# Patient Record
Sex: MALE | Race: WHITE | NOT HISPANIC OR LATINO | Employment: FULL TIME | URBAN - METROPOLITAN AREA
[De-identification: names, ages, dates, MRNs, and addresses within clinical notes are randomized per-mention and may not be internally consistent; named-entity substitution may affect disease eponyms.]

---

## 2019-04-25 ENCOUNTER — APPOINTMENT (OUTPATIENT)
Dept: RADIOLOGY | Facility: MEDICAL CENTER | Age: 29
End: 2019-04-25
Attending: EMERGENCY MEDICINE
Payer: COMMERCIAL

## 2019-04-25 ENCOUNTER — HOSPITAL ENCOUNTER (EMERGENCY)
Facility: MEDICAL CENTER | Age: 29
End: 2019-04-25
Attending: EMERGENCY MEDICINE
Payer: COMMERCIAL

## 2019-04-25 VITALS
WEIGHT: 203.04 LBS | TEMPERATURE: 98.8 F | HEIGHT: 75 IN | HEART RATE: 76 BPM | DIASTOLIC BLOOD PRESSURE: 31 MMHG | BODY MASS INDEX: 25.25 KG/M2 | OXYGEN SATURATION: 92 % | SYSTOLIC BLOOD PRESSURE: 112 MMHG | RESPIRATION RATE: 16 BRPM

## 2019-04-25 DIAGNOSIS — J10.1 INFLUENZA A: ICD-10-CM

## 2019-04-25 LAB
ALBUMIN SERPL BCP-MCNC: 4.7 G/DL (ref 3.2–4.9)
ALBUMIN/GLOB SERPL: 2 G/DL
ALP SERPL-CCNC: 42 U/L (ref 30–99)
ALT SERPL-CCNC: 16 U/L (ref 2–50)
ANION GAP SERPL CALC-SCNC: 9 MMOL/L (ref 0–11.9)
APPEARANCE UR: CLEAR
AST SERPL-CCNC: 19 U/L (ref 12–45)
BACTERIA #/AREA URNS HPF: NEGATIVE /HPF
BASOPHILS # BLD AUTO: 0.9 % (ref 0–1.8)
BASOPHILS # BLD: 0.04 K/UL (ref 0–0.12)
BILIRUB SERPL-MCNC: 0.9 MG/DL (ref 0.1–1.5)
BILIRUB UR QL STRIP.AUTO: NEGATIVE
BUN SERPL-MCNC: 14 MG/DL (ref 8–22)
CALCIUM SERPL-MCNC: 8.9 MG/DL (ref 8.4–10.2)
CHLORIDE SERPL-SCNC: 101 MMOL/L (ref 96–112)
CO2 SERPL-SCNC: 24 MMOL/L (ref 20–33)
COLOR UR: YELLOW
CREAT SERPL-MCNC: 1.51 MG/DL (ref 0.5–1.4)
EOSINOPHIL # BLD AUTO: 0.03 K/UL (ref 0–0.51)
EOSINOPHIL NFR BLD: 0.7 % (ref 0–6.9)
EPI CELLS #/AREA URNS HPF: NEGATIVE /HPF
ERYTHROCYTE [DISTWIDTH] IN BLOOD BY AUTOMATED COUNT: 38.6 FL (ref 35.9–50)
FLUAV RNA SPEC QL NAA+PROBE: POSITIVE
FLUBV RNA SPEC QL NAA+PROBE: NEGATIVE
GLOBULIN SER CALC-MCNC: 2.4 G/DL (ref 1.9–3.5)
GLUCOSE SERPL-MCNC: 95 MG/DL (ref 65–99)
GLUCOSE UR STRIP.AUTO-MCNC: NEGATIVE MG/DL
HCT VFR BLD AUTO: 43.7 % (ref 42–52)
HGB BLD-MCNC: 15 G/DL (ref 14–18)
HYALINE CASTS #/AREA URNS LPF: ABNORMAL /LPF
IMM GRANULOCYTES # BLD AUTO: 0.01 K/UL (ref 0–0.11)
IMM GRANULOCYTES NFR BLD AUTO: 0.2 % (ref 0–0.9)
KETONES UR STRIP.AUTO-MCNC: ABNORMAL MG/DL
LACTATE BLD-SCNC: 1.1 MMOL/L (ref 0.5–2)
LEUKOCYTE ESTERASE UR QL STRIP.AUTO: NEGATIVE
LYMPHOCYTES # BLD AUTO: 0.8 K/UL (ref 1–4.8)
LYMPHOCYTES NFR BLD: 18.6 % (ref 22–41)
MCH RBC QN AUTO: 30.1 PG (ref 27–33)
MCHC RBC AUTO-ENTMCNC: 34.3 G/DL (ref 33.7–35.3)
MCV RBC AUTO: 87.6 FL (ref 81.4–97.8)
MICRO URNS: ABNORMAL
MONOCYTES # BLD AUTO: 0.63 K/UL (ref 0–0.85)
MONOCYTES NFR BLD AUTO: 14.6 % (ref 0–13.4)
MUCOUS THREADS #/AREA URNS HPF: ABNORMAL /HPF
NEUTROPHILS # BLD AUTO: 2.8 K/UL (ref 1.82–7.42)
NEUTROPHILS NFR BLD: 65 % (ref 44–72)
NITRITE UR QL STRIP.AUTO: NEGATIVE
NRBC # BLD AUTO: 0 K/UL
NRBC BLD-RTO: 0 /100 WBC
PH UR STRIP.AUTO: 6 [PH]
PLATELET # BLD AUTO: 140 K/UL (ref 164–446)
PMV BLD AUTO: 10.4 FL (ref 9–12.9)
POTASSIUM SERPL-SCNC: 3.5 MMOL/L (ref 3.6–5.5)
PROT SERPL-MCNC: 7.1 G/DL (ref 6–8.2)
PROT UR QL STRIP: 30 MG/DL
RBC # BLD AUTO: 4.99 M/UL (ref 4.7–6.1)
RBC # URNS HPF: ABNORMAL /HPF
RBC UR QL AUTO: NEGATIVE
SODIUM SERPL-SCNC: 134 MMOL/L (ref 135–145)
SP GR UR STRIP.AUTO: 1.01
WBC # BLD AUTO: 4.3 K/UL (ref 4.8–10.8)
WBC #/AREA URNS HPF: ABNORMAL /HPF

## 2019-04-25 PROCEDURE — 87086 URINE CULTURE/COLONY COUNT: CPT

## 2019-04-25 PROCEDURE — 81001 URINALYSIS AUTO W/SCOPE: CPT

## 2019-04-25 PROCEDURE — 87040 BLOOD CULTURE FOR BACTERIA: CPT | Mod: 91

## 2019-04-25 PROCEDURE — 71046 X-RAY EXAM CHEST 2 VIEWS: CPT

## 2019-04-25 PROCEDURE — 36415 COLL VENOUS BLD VENIPUNCTURE: CPT

## 2019-04-25 PROCEDURE — 85025 COMPLETE CBC W/AUTO DIFF WBC: CPT

## 2019-04-25 PROCEDURE — 83605 ASSAY OF LACTIC ACID: CPT

## 2019-04-25 PROCEDURE — 99284 EMERGENCY DEPT VISIT MOD MDM: CPT

## 2019-04-25 PROCEDURE — 87502 INFLUENZA DNA AMP PROBE: CPT

## 2019-04-25 PROCEDURE — A9270 NON-COVERED ITEM OR SERVICE: HCPCS | Performed by: EMERGENCY MEDICINE

## 2019-04-25 PROCEDURE — 80053 COMPREHEN METABOLIC PANEL: CPT

## 2019-04-25 PROCEDURE — 700102 HCHG RX REV CODE 250 W/ 637 OVERRIDE(OP): Performed by: EMERGENCY MEDICINE

## 2019-04-25 RX ORDER — IBUPROFEN 600 MG/1
600 TABLET ORAL ONCE
Status: COMPLETED | OUTPATIENT
Start: 2019-04-25 | End: 2019-04-25

## 2019-04-25 RX ORDER — BENZONATATE 100 MG/1
100 CAPSULE ORAL 3 TIMES DAILY PRN
Qty: 15 CAP | Refills: 0 | Status: SHIPPED | OUTPATIENT
Start: 2019-04-25 | End: 2019-04-30

## 2019-04-25 RX ORDER — OSELTAMIVIR PHOSPHATE 75 MG/1
75 CAPSULE ORAL 2 TIMES DAILY
Qty: 10 CAP | Refills: 0 | Status: SHIPPED | OUTPATIENT
Start: 2019-04-25 | End: 2019-04-30

## 2019-04-25 RX ORDER — ACETAMINOPHEN 325 MG/1
650 TABLET ORAL ONCE
Status: COMPLETED | OUTPATIENT
Start: 2019-04-25 | End: 2019-04-25

## 2019-04-25 RX ADMIN — ACETAMINOPHEN 650 MG: 325 TABLET, FILM COATED ORAL at 14:22

## 2019-04-25 RX ADMIN — IBUPROFEN 600 MG: 600 TABLET ORAL at 14:23

## 2019-04-25 NOTE — ED NOTES
jeramie from Lab called with critical result of influenza at A positive. Critical lab result read back to jeramie.   Dr. white notified of critical lab result at 1552.  Critical lab result read back by Dr. white.

## 2019-04-25 NOTE — DISCHARGE INSTRUCTIONS
Return to the ER for any worsening cough, coughing of rust colored phlegm or blood, chest pain, shortness of breath, persistent fevers over 101, headache, stiff neck, rash, vomiting, or for any concerns.    Avoid contact with the public as you are extremely contagious.    Drink plenty of fluids and get lots of rest!  Take over-the-counter Tylenol and ibuprofen for fever control and muscle aches/body aches.

## 2019-04-25 NOTE — ED NOTES
PIV started, blood and 1 st culture sent along with a UA. Plan discussed with pt. Medicated for fever.

## 2019-04-25 NOTE — ED NOTES
Sepsis score recalculated to a 2, sepsis orders held in leiu of ERP evaluation. this pt states he has seasonal allergies and usually gets a Kenalog shot. He does have a mask on. Refused his chest xray

## 2019-04-25 NOTE — ED NOTES
Released with all written instructions for flu, released to POV, encouraged to drink fluids and rest, advised to return with any worsening and to take Tylenol and Motrin for fever control.

## 2019-04-25 NOTE — ED TRIAGE NOTES
"Chief Complaint   Patient presents with   • Fever     started last NOC   • Cough     started yesterday, reports gets \"severe\" allegeries every year, has not been able to get Kenalog shot this year     /73   Pulse 96   Temp (!) 39 °C (102.2 °F) (Temporal)   Resp 17   Ht 1.905 m (6' 3\")   Wt 92.1 kg (203 lb 0.7 oz)   SpO2 96%   BMI 25.38 kg/m²     "

## 2019-04-25 NOTE — ED PROVIDER NOTES
"ED Provider Note    CHIEF COMPLAINT  Chief Complaint   Patient presents with   • Fever     started last NOC   • Cough     started yesterday, reports gets \"severe\" allegeries every year, has not been able to get Kenalog shot this year       HPI  Phillip Cardoso is a 28 y.o. male who presents with complaint of dry cough, little bit of lightheadedness and some fevers and chills since yesterday afternoon.  Patient states that 2 weeks ago he started developing what he thought were \"allergies.\"  He states he gets allergies every year about the same time.  When he started getting allergy symptoms he made an appoint with his primary care physician.  He has an appointment with his PMD this coming Monday open (5 days from now.)  This is to get his Kenalog shot.  He states over the last 2 weeks he has had nasal congestion, runny nose, itchy watery eyes.  He states that yesterday he was working out in the yard.  He was moving sod around the yard.  He states in the afternoon he started feeling lightheaded almost as if he had not eaten enough and did not have \"enough energy\" for the work he was doing.  Last night he had chills.  He suspected he might of had a subjective fever.  He had a place an extra sweatshirt on to keep from getting too cold.  He also developed a dry cough.  He has some nasal congestion and runny nose with thought it was related to his allergies.  He has a temperature here today in the emergency room.  He describes a little bit of myalgias, mostly in his low back but thought it was because of sitting on the couch for too long last night.  He is a bit of a headache but states is not the worst of his life.  No rash.  No stiff neck.  No nausea or vomiting.  No diarrhea.  No abdominal pain.  No shortness of breath or chest pains.  No coughing of phlegm or blood.    REVIEW OF SYSTEMS  See HPI for further details. All other systems are negative.    PAST MEDICAL HISTORY  History reviewed. No pertinent past " "medical history.    FAMILY HISTORY  History reviewed. No pertinent family history.    SOCIAL HISTORY  Social History     Social History   • Marital status: Single     Spouse name: N/A   • Number of children: N/A   • Years of education: N/A     Social History Main Topics   • Smoking status: Never Smoker   • Smokeless tobacco: Not on file   • Alcohol use Yes   • Drug use: No   • Sexual activity: Not on file     Other Topics Concern   • Not on file     Social History Narrative   • No narrative on file       SURGICAL HISTORY  History reviewed. No pertinent surgical history.    CURRENT MEDICATIONS  Home Medications     Reviewed by Norbert Dietz (Pharmacy Tech) on 04/25/19 at 1353  Med List Status: Complete   Medication Last Dose Status        Patient Karel Taking any Medications                       ALLERGIES  No Known Allergies    PHYSICAL EXAM  VITAL SIGNS: /73   Pulse 96   Temp (!) 39 °C (102.2 °F) (Temporal)   Resp 17   Ht 1.905 m (6' 3\")   Wt 92.1 kg (203 lb 0.7 oz)   SpO2 96%   BMI 25.38 kg/m²    Constitutional: Well developed, well nourished; No acute distress; Non-toxic appearance.   HENT: Normocephalic, atraumatic; Bilateral external ears normal; TMs are clear.  Oropharynx with moist mucous membranes; mild erythema without exudates in the posterior oropharynx.   Eyes: PERRL, EOMI, Conjunctiva normal. No discharge.   Neck:  Supple, nontender midline; No stridor; No nuchal rigidity.   Lymphatic: No cervical lymphadenopathy noted.   Cardiovascular: Regular rate and rhythm without murmurs, rubs, or gallop.   Thorax & Lungs: No respiratory distress, breath sounds clear to auscultation bilaterally without wheezing, rales or rhonchi. Nontender chest wall. No crepitus or subcutaneous air  Abdomen: Soft, nontender, bowel sounds normal. No obvious masses; No pulsatile masses; no rebound, guarding, or peritoneal signs.   Skin: Good color; warm and dry without rash or petechia.  Back: Nontender, No CVA " tenderness.   Extremities: Distal dorsalis pedis, posterior tibial pulses are equal bilaterally; No edema; Nontender calves or saphenous.   Musculoskeletal: Good range of motion in all major joints. No tenderness to palpation or major deformities noted.   Neurologic: Alert & oriented x 4, clear speech      RADIOLOGY/PROCEDURES  DX-CHEST-2 VIEWS   Final Result      No acute cardiopulmonary findings.        Results for orders placed or performed during the hospital encounter of 04/25/19   Lactic acid (lactate)   Result Value Ref Range    Lactic Acid 1.1 0.5 - 2.0 mmol/L   CBC WITH DIFFERENTIAL   Result Value Ref Range    WBC 4.3 (L) 4.8 - 10.8 K/uL    RBC 4.99 4.70 - 6.10 M/uL    Hemoglobin 15.0 14.0 - 18.0 g/dL    Hematocrit 43.7 42.0 - 52.0 %    MCV 87.6 81.4 - 97.8 fL    MCH 30.1 27.0 - 33.0 pg    MCHC 34.3 33.7 - 35.3 g/dL    RDW 38.6 35.9 - 50.0 fL    Platelet Count 140 (L) 164 - 446 K/uL    MPV 10.4 9.0 - 12.9 fL    Neutrophils-Polys 65.00 44.00 - 72.00 %    Lymphocytes 18.60 (L) 22.00 - 41.00 %    Monocytes 14.60 (H) 0.00 - 13.40 %    Eosinophils 0.70 0.00 - 6.90 %    Basophils 0.90 0.00 - 1.80 %    Immature Granulocytes 0.20 0.00 - 0.90 %    Nucleated RBC 0.00 /100 WBC    Neutrophils (Absolute) 2.80 1.82 - 7.42 K/uL    Lymphs (Absolute) 0.80 (L) 1.00 - 4.80 K/uL    Monos (Absolute) 0.63 0.00 - 0.85 K/uL    Eos (Absolute) 0.03 0.00 - 0.51 K/uL    Baso (Absolute) 0.04 0.00 - 0.12 K/uL    Immature Granulocytes (abs) 0.01 0.00 - 0.11 K/uL    NRBC (Absolute) 0.00 K/uL   COMP METABOLIC PANEL   Result Value Ref Range    Sodium 134 (L) 135 - 145 mmol/L    Potassium 3.5 (L) 3.6 - 5.5 mmol/L    Chloride 101 96 - 112 mmol/L    Co2 24 20 - 33 mmol/L    Anion Gap 9.0 0.0 - 11.9    Glucose 95 65 - 99 mg/dL    Bun 14 8 - 22 mg/dL    Creatinine 1.51 (H) 0.50 - 1.40 mg/dL    Calcium 8.9 8.4 - 10.2 mg/dL    AST(SGOT) 19 12 - 45 U/L    ALT(SGPT) 16 2 - 50 U/L    Alkaline Phosphatase 42 30 - 99 U/L    Total Bilirubin 0.9 0.1 -  1.5 mg/dL    Albumin 4.7 3.2 - 4.9 g/dL    Total Protein 7.1 6.0 - 8.2 g/dL    Globulin 2.4 1.9 - 3.5 g/dL    A-G Ratio 2.0 g/dL   URINALYSIS   Result Value Ref Range    Color Yellow     Character Clear     Specific Gravity 1.015 <1.035    Ph 6.0 5.0 - 8.0    Glucose Negative Negative mg/dL    Ketones Trace (A) Negative mg/dL    Protein 30 (A) Negative mg/dL    Bilirubin Negative Negative    Nitrite Negative Negative    Leukocyte Esterase Negative Negative    Occult Blood Negative Negative    Micro Urine Req Microscopic    Influenza A/B By PCR (Adult - Flu Only)   Result Value Ref Range    Influenza virus A RNA POSITIVE (A) Negative    Influenza virus B, PCR Negative Negative   URINE MICROSCOPIC (W/UA)   Result Value Ref Range    WBC 0-2 (A) /hpf    RBC 0-2 (A) /hpf    Bacteria Negative None /hpf    Epithelial Cells Negative Few /hpf    Mucous Threads Many /hpf    Hyaline Cast 0-2 /lpf   ESTIMATED GFR   Result Value Ref Range    GFR If African American >60 >60 mL/min/1.73 m 2    GFR If Non African American 54 (A) >60 mL/min/1.73 m 2       COURSE & MEDICAL DECISION MAKING  Pertinent Labs & Imaging studies reviewed. (See chart for details)  Patient presents to the ER complaining of cough, some mild myalgias, headache, fevers and chills.  Symptoms began yesterday afternoon.  Patient has clear lungs.  Chest x-ray is negative.  No evidence for pneumonia.  Vital signs are normal and stable.  He is not hypoxic.  Lab work was obtained per sepsis protocol in triage.  The patient is not septic.  He is not hypotensive or tachycardic.  He is a otherwise young healthy male.  He is positive for influenza A.  Urinalysis is clear.  No evidence for UTI.  Patient will go home with Tamiflu and Tessalon.  He is to avoid contact with the public.  He understands he is very contagious.  He is been given strict return precautions and discharge instructions and understands if he gets worse in any way he must return to the ER  "immediately.    Discharge vitals BP (!) 112/31   Pulse 76   Temp 37.1 °C (98.8 °F) (Temporal)   Resp 16   Ht 1.905 m (6' 3\")   Wt 92.1 kg (203 lb 0.7 oz)   SpO2 92%     FINAL IMPRESSION    1. Influenza A Acute           Electronically signed by: Dena Price, 4/25/2019 2:06 PM    "

## 2019-04-25 NOTE — ED NOTES
Med Rec completed per patient  Allergies reviewed  No ORAL antibiotics in last 30 days    Patient wilfred taking any medications

## 2019-04-28 LAB
BACTERIA UR CULT: NORMAL
SIGNIFICANT IND 70042: NORMAL
SITE SITE: NORMAL
SOURCE SOURCE: NORMAL

## 2019-04-30 LAB
BACTERIA BLD CULT: NORMAL
BACTERIA BLD CULT: NORMAL
SIGNIFICANT IND 70042: NORMAL
SIGNIFICANT IND 70042: NORMAL
SITE SITE: NORMAL
SITE SITE: NORMAL
SOURCE SOURCE: NORMAL
SOURCE SOURCE: NORMAL

## 2019-08-19 ENCOUNTER — HOSPITAL ENCOUNTER (OUTPATIENT)
Dept: RADIOLOGY | Facility: MEDICAL CENTER | Age: 29
End: 2019-08-19
Attending: OTOLARYNGOLOGY
Payer: COMMERCIAL

## 2019-08-19 PROCEDURE — 70553 MRI BRAIN STEM W/O & W/DYE: CPT

## 2019-08-19 PROCEDURE — 700117 HCHG RX CONTRAST REV CODE 255: Performed by: OTOLARYNGOLOGY

## 2019-08-19 RX ADMIN — GADOTERIDOL 20 ML: 279.3 INJECTION, SOLUTION INTRAVENOUS at 10:15

## 2021-08-08 ENCOUNTER — APPOINTMENT (OUTPATIENT)
Dept: RADIOLOGY | Facility: MEDICAL CENTER | Age: 31
End: 2021-08-08
Attending: EMERGENCY MEDICINE
Payer: COMMERCIAL

## 2021-08-08 ENCOUNTER — ANESTHESIA EVENT (OUTPATIENT)
Dept: SURGERY | Facility: MEDICAL CENTER | Age: 31
End: 2021-08-08
Payer: COMMERCIAL

## 2021-08-08 ENCOUNTER — ANESTHESIA (OUTPATIENT)
Dept: SURGERY | Facility: MEDICAL CENTER | Age: 31
End: 2021-08-08
Payer: COMMERCIAL

## 2021-08-08 ENCOUNTER — HOSPITAL ENCOUNTER (EMERGENCY)
Facility: MEDICAL CENTER | Age: 31
End: 2021-08-08
Attending: EMERGENCY MEDICINE
Payer: COMMERCIAL

## 2021-08-08 VITALS
DIASTOLIC BLOOD PRESSURE: 65 MMHG | RESPIRATION RATE: 16 BRPM | HEIGHT: 75 IN | SYSTOLIC BLOOD PRESSURE: 139 MMHG | WEIGHT: 205.03 LBS | TEMPERATURE: 98.2 F | OXYGEN SATURATION: 99 % | BODY MASS INDEX: 25.49 KG/M2 | HEART RATE: 60 BPM

## 2021-08-08 DIAGNOSIS — K35.80 ACUTE APPENDICITIS, UNSPECIFIED ACUTE APPENDICITIS TYPE: ICD-10-CM

## 2021-08-08 DIAGNOSIS — G89.18 POST-OPERATIVE PAIN: ICD-10-CM

## 2021-08-08 LAB
ALBUMIN SERPL BCP-MCNC: 4.7 G/DL (ref 3.2–4.9)
ALBUMIN/GLOB SERPL: 1.9 G/DL
ALP SERPL-CCNC: 72 U/L (ref 30–99)
ALT SERPL-CCNC: 17 U/L (ref 2–50)
ANION GAP SERPL CALC-SCNC: 12 MMOL/L (ref 7–16)
APPEARANCE UR: CLEAR
AST SERPL-CCNC: 18 U/L (ref 12–45)
BASOPHILS # BLD AUTO: 0.3 % (ref 0–1.8)
BASOPHILS # BLD: 0.03 K/UL (ref 0–0.12)
BILIRUB SERPL-MCNC: 0.5 MG/DL (ref 0.1–1.5)
BILIRUB UR QL STRIP.AUTO: NEGATIVE
BUN SERPL-MCNC: 18 MG/DL (ref 8–22)
CALCIUM SERPL-MCNC: 9.6 MG/DL (ref 8.4–10.2)
CHLORIDE SERPL-SCNC: 99 MMOL/L (ref 96–112)
CO2 SERPL-SCNC: 25 MMOL/L (ref 20–33)
COLOR UR: YELLOW
CREAT SERPL-MCNC: 1.1 MG/DL (ref 0.5–1.4)
EOSINOPHIL # BLD AUTO: 0.1 K/UL (ref 0–0.51)
EOSINOPHIL NFR BLD: 0.9 % (ref 0–6.9)
ERYTHROCYTE [DISTWIDTH] IN BLOOD BY AUTOMATED COUNT: 38.9 FL (ref 35.9–50)
GLOBULIN SER CALC-MCNC: 2.5 G/DL (ref 1.9–3.5)
GLUCOSE SERPL-MCNC: 90 MG/DL (ref 65–99)
GLUCOSE UR STRIP.AUTO-MCNC: NEGATIVE MG/DL
HCT VFR BLD AUTO: 45.5 % (ref 42–52)
HGB BLD-MCNC: 15.8 G/DL (ref 14–18)
IMM GRANULOCYTES # BLD AUTO: 0.04 K/UL (ref 0–0.11)
IMM GRANULOCYTES NFR BLD AUTO: 0.4 % (ref 0–0.9)
KETONES UR STRIP.AUTO-MCNC: NEGATIVE MG/DL
LEUKOCYTE ESTERASE UR QL STRIP.AUTO: NEGATIVE
LIPASE SERPL-CCNC: 19 U/L (ref 7–58)
LYMPHOCYTES # BLD AUTO: 1.21 K/UL (ref 1–4.8)
LYMPHOCYTES NFR BLD: 10.8 % (ref 22–41)
MCH RBC QN AUTO: 30.4 PG (ref 27–33)
MCHC RBC AUTO-ENTMCNC: 34.7 G/DL (ref 33.7–35.3)
MCV RBC AUTO: 87.5 FL (ref 81.4–97.8)
MICRO URNS: NORMAL
MONOCYTES # BLD AUTO: 0.88 K/UL (ref 0–0.85)
MONOCYTES NFR BLD AUTO: 7.9 % (ref 0–13.4)
NEUTROPHILS # BLD AUTO: 8.94 K/UL (ref 1.82–7.42)
NEUTROPHILS NFR BLD: 79.7 % (ref 44–72)
NITRITE UR QL STRIP.AUTO: NEGATIVE
NRBC # BLD AUTO: 0 K/UL
NRBC BLD-RTO: 0 /100 WBC
PATHOLOGY CONSULT NOTE: NORMAL
PH UR STRIP.AUTO: 6 [PH] (ref 5–8)
PLATELET # BLD AUTO: 176 K/UL (ref 164–446)
PMV BLD AUTO: 10.4 FL (ref 9–12.9)
POTASSIUM SERPL-SCNC: 4.1 MMOL/L (ref 3.6–5.5)
PROT SERPL-MCNC: 7.2 G/DL (ref 6–8.2)
PROT UR QL STRIP: NEGATIVE MG/DL
RBC # BLD AUTO: 5.2 M/UL (ref 4.7–6.1)
RBC UR QL AUTO: NEGATIVE
SARS-COV+SARS-COV-2 AG RESP QL IA.RAPID: NOTDETECTED
SODIUM SERPL-SCNC: 136 MMOL/L (ref 135–145)
SP GR UR STRIP.AUTO: 1.02
SPECIMEN SOURCE: NORMAL
WBC # BLD AUTO: 11.2 K/UL (ref 4.8–10.8)

## 2021-08-08 PROCEDURE — 700105 HCHG RX REV CODE 258: Performed by: EMERGENCY MEDICINE

## 2021-08-08 PROCEDURE — 501838 HCHG SUTURE GENERAL: Performed by: SURGERY

## 2021-08-08 PROCEDURE — 160048 HCHG OR STATISTICAL LEVEL 1-5: Performed by: SURGERY

## 2021-08-08 PROCEDURE — 500868 HCHG NEEDLE, SURGI(VARES): Performed by: SURGERY

## 2021-08-08 PROCEDURE — 99291 CRITICAL CARE FIRST HOUR: CPT

## 2021-08-08 PROCEDURE — 501582 HCHG TROCAR, THRD BLADED: Performed by: SURGERY

## 2021-08-08 PROCEDURE — 501399 HCHG SPECIMAN BAG, ENDO CATC: Performed by: SURGERY

## 2021-08-08 PROCEDURE — 160009 HCHG ANES TIME/MIN: Performed by: SURGERY

## 2021-08-08 PROCEDURE — 87426 SARSCOV CORONAVIRUS AG IA: CPT

## 2021-08-08 PROCEDURE — 700101 HCHG RX REV CODE 250: Performed by: SURGERY

## 2021-08-08 PROCEDURE — 160029 HCHG SURGERY MINUTES - 1ST 30 MINS LEVEL 4: Performed by: SURGERY

## 2021-08-08 PROCEDURE — 160025 RECOVERY II MINUTES (STATS): Performed by: SURGERY

## 2021-08-08 PROCEDURE — 501583 HCHG TROCAR, THRD CAN&SEAL 5X100: Performed by: SURGERY

## 2021-08-08 PROCEDURE — 96365 THER/PROPH/DIAG IV INF INIT: CPT

## 2021-08-08 PROCEDURE — 85025 COMPLETE CBC W/AUTO DIFF WBC: CPT

## 2021-08-08 PROCEDURE — 500367 HCHG DRAIN KIT, HEMOVAC: Performed by: SURGERY

## 2021-08-08 PROCEDURE — 501450 HCHG STAPLES, ENDO MULTIFIRE: Performed by: SURGERY

## 2021-08-08 PROCEDURE — 83690 ASSAY OF LIPASE: CPT

## 2021-08-08 PROCEDURE — 88304 TISSUE EXAM BY PATHOLOGIST: CPT

## 2021-08-08 PROCEDURE — 700111 HCHG RX REV CODE 636 W/ 250 OVERRIDE (IP): Performed by: EMERGENCY MEDICINE

## 2021-08-08 PROCEDURE — 160002 HCHG RECOVERY MINUTES (STAT): Performed by: SURGERY

## 2021-08-08 PROCEDURE — 700111 HCHG RX REV CODE 636 W/ 250 OVERRIDE (IP): Performed by: ANESTHESIOLOGY

## 2021-08-08 PROCEDURE — 96367 TX/PROPH/DG ADDL SEQ IV INF: CPT

## 2021-08-08 PROCEDURE — 160046 HCHG PACU - 1ST 60 MINS PHASE II: Performed by: SURGERY

## 2021-08-08 PROCEDURE — 700101 HCHG RX REV CODE 250: Performed by: EMERGENCY MEDICINE

## 2021-08-08 PROCEDURE — 96368 THER/DIAG CONCURRENT INF: CPT

## 2021-08-08 PROCEDURE — 501570 HCHG TROCAR, SEPARATOR: Performed by: SURGERY

## 2021-08-08 PROCEDURE — 502571 HCHG PACK, LAP CHOLE: Performed by: SURGERY

## 2021-08-08 PROCEDURE — 160041 HCHG SURGERY MINUTES - EA ADDL 1 MIN LEVEL 4: Performed by: SURGERY

## 2021-08-08 PROCEDURE — 81003 URINALYSIS AUTO W/O SCOPE: CPT

## 2021-08-08 PROCEDURE — 700117 HCHG RX CONTRAST REV CODE 255: Performed by: EMERGENCY MEDICINE

## 2021-08-08 PROCEDURE — 74177 CT ABD & PELVIS W/CONTRAST: CPT

## 2021-08-08 PROCEDURE — 700101 HCHG RX REV CODE 250: Performed by: ANESTHESIOLOGY

## 2021-08-08 PROCEDURE — 80053 COMPREHEN METABOLIC PANEL: CPT

## 2021-08-08 PROCEDURE — 160035 HCHG PACU - 1ST 60 MINS PHASE I: Performed by: SURGERY

## 2021-08-08 RX ORDER — SODIUM CHLORIDE, SODIUM LACTATE, POTASSIUM CHLORIDE, CALCIUM CHLORIDE 600; 310; 30; 20 MG/100ML; MG/100ML; MG/100ML; MG/100ML
INJECTION, SOLUTION INTRAVENOUS CONTINUOUS
Status: DISCONTINUED | OUTPATIENT
Start: 2021-08-08 | End: 2021-08-08

## 2021-08-08 RX ORDER — MEPERIDINE HYDROCHLORIDE 25 MG/ML
12.5 INJECTION INTRAMUSCULAR; INTRAVENOUS; SUBCUTANEOUS
Status: DISCONTINUED | OUTPATIENT
Start: 2021-08-08 | End: 2021-08-11 | Stop reason: HOSPADM

## 2021-08-08 RX ORDER — HYDROMORPHONE HYDROCHLORIDE 1 MG/ML
0.1 INJECTION, SOLUTION INTRAMUSCULAR; INTRAVENOUS; SUBCUTANEOUS
Status: DISCONTINUED | OUTPATIENT
Start: 2021-08-08 | End: 2021-08-11 | Stop reason: HOSPADM

## 2021-08-08 RX ORDER — KETOROLAC TROMETHAMINE 30 MG/ML
INJECTION, SOLUTION INTRAMUSCULAR; INTRAVENOUS PRN
Status: DISCONTINUED | OUTPATIENT
Start: 2021-08-08 | End: 2021-08-08 | Stop reason: SURG

## 2021-08-08 RX ORDER — HYDROMORPHONE HYDROCHLORIDE 1 MG/ML
0.2 INJECTION, SOLUTION INTRAMUSCULAR; INTRAVENOUS; SUBCUTANEOUS
Status: DISCONTINUED | OUTPATIENT
Start: 2021-08-08 | End: 2021-08-11 | Stop reason: HOSPADM

## 2021-08-08 RX ORDER — ONDANSETRON 2 MG/ML
4 INJECTION INTRAMUSCULAR; INTRAVENOUS
Status: DISCONTINUED | OUTPATIENT
Start: 2021-08-08 | End: 2021-08-11 | Stop reason: HOSPADM

## 2021-08-08 RX ORDER — LIDOCAINE HYDROCHLORIDE 20 MG/ML
INJECTION, SOLUTION EPIDURAL; INFILTRATION; INTRACAUDAL; PERINEURAL PRN
Status: DISCONTINUED | OUTPATIENT
Start: 2021-08-08 | End: 2021-08-08 | Stop reason: SURG

## 2021-08-08 RX ORDER — ONDANSETRON 2 MG/ML
INJECTION INTRAMUSCULAR; INTRAVENOUS PRN
Status: DISCONTINUED | OUTPATIENT
Start: 2021-08-08 | End: 2021-08-08 | Stop reason: SURG

## 2021-08-08 RX ORDER — SODIUM CHLORIDE, SODIUM LACTATE, POTASSIUM CHLORIDE, CALCIUM CHLORIDE 600; 310; 30; 20 MG/100ML; MG/100ML; MG/100ML; MG/100ML
INJECTION, SOLUTION INTRAVENOUS CONTINUOUS
Status: DISCONTINUED | OUTPATIENT
Start: 2021-08-08 | End: 2021-08-11 | Stop reason: HOSPADM

## 2021-08-08 RX ORDER — HYDROMORPHONE HYDROCHLORIDE 1 MG/ML
0.4 INJECTION, SOLUTION INTRAMUSCULAR; INTRAVENOUS; SUBCUTANEOUS
Status: DISCONTINUED | OUTPATIENT
Start: 2021-08-08 | End: 2021-08-11 | Stop reason: HOSPADM

## 2021-08-08 RX ORDER — DEXAMETHASONE SODIUM PHOSPHATE 4 MG/ML
INJECTION, SOLUTION INTRA-ARTICULAR; INTRALESIONAL; INTRAMUSCULAR; INTRAVENOUS; SOFT TISSUE PRN
Status: DISCONTINUED | OUTPATIENT
Start: 2021-08-08 | End: 2021-08-08 | Stop reason: SURG

## 2021-08-08 RX ORDER — OXYCODONE HYDROCHLORIDE 5 MG/1
5 TABLET ORAL EVERY 4 HOURS PRN
Qty: 15 TABLET | Refills: 0 | Status: SHIPPED | OUTPATIENT
Start: 2021-08-08 | End: 2021-08-12

## 2021-08-08 RX ORDER — ROCURONIUM BROMIDE 10 MG/ML
INJECTION, SOLUTION INTRAVENOUS PRN
Status: DISCONTINUED | OUTPATIENT
Start: 2021-08-08 | End: 2021-08-08 | Stop reason: SURG

## 2021-08-08 RX ORDER — HYDRALAZINE HYDROCHLORIDE 20 MG/ML
5 INJECTION INTRAMUSCULAR; INTRAVENOUS
Status: DISCONTINUED | OUTPATIENT
Start: 2021-08-08 | End: 2021-08-11 | Stop reason: HOSPADM

## 2021-08-08 RX ORDER — DIPHENHYDRAMINE HYDROCHLORIDE 50 MG/ML
6.25 INJECTION INTRAMUSCULAR; INTRAVENOUS
Status: DISCONTINUED | OUTPATIENT
Start: 2021-08-08 | End: 2021-08-11 | Stop reason: HOSPADM

## 2021-08-08 RX ORDER — HALOPERIDOL 5 MG/ML
1 INJECTION INTRAMUSCULAR
Status: DISCONTINUED | OUTPATIENT
Start: 2021-08-08 | End: 2021-08-11 | Stop reason: HOSPADM

## 2021-08-08 RX ORDER — OXYCODONE HCL 5 MG/5 ML
10 SOLUTION, ORAL ORAL
Status: DISCONTINUED | OUTPATIENT
Start: 2021-08-08 | End: 2021-08-11 | Stop reason: HOSPADM

## 2021-08-08 RX ORDER — OXYCODONE HCL 5 MG/5 ML
5 SOLUTION, ORAL ORAL
Status: DISCONTINUED | OUTPATIENT
Start: 2021-08-08 | End: 2021-08-11 | Stop reason: HOSPADM

## 2021-08-08 RX ORDER — SODIUM CHLORIDE, SODIUM LACTATE, POTASSIUM CHLORIDE, CALCIUM CHLORIDE 600; 310; 30; 20 MG/100ML; MG/100ML; MG/100ML; MG/100ML
1000 INJECTION, SOLUTION INTRAVENOUS CONTINUOUS
Status: DISCONTINUED | OUTPATIENT
Start: 2021-08-08 | End: 2021-08-08

## 2021-08-08 RX ORDER — BUPIVACAINE HYDROCHLORIDE AND EPINEPHRINE 5; 5 MG/ML; UG/ML
INJECTION, SOLUTION EPIDURAL; INTRACAUDAL; PERINEURAL
Status: DISCONTINUED | OUTPATIENT
Start: 2021-08-08 | End: 2021-08-08 | Stop reason: HOSPADM

## 2021-08-08 RX ADMIN — FENTANYL CITRATE 100 MCG: 50 INJECTION, SOLUTION INTRAMUSCULAR; INTRAVENOUS at 19:47

## 2021-08-08 RX ADMIN — PROPOFOL 200 MG: 10 INJECTION, EMULSION INTRAVENOUS at 19:47

## 2021-08-08 RX ADMIN — DEXAMETHASONE SODIUM PHOSPHATE 8 MG: 4 INJECTION, SOLUTION INTRAMUSCULAR; INTRAVENOUS at 19:51

## 2021-08-08 RX ADMIN — CEFTRIAXONE SODIUM 2 G: 2 INJECTION, POWDER, FOR SOLUTION INTRAMUSCULAR; INTRAVENOUS at 18:11

## 2021-08-08 RX ADMIN — METRONIDAZOLE 500 MG: 500 INJECTION, SOLUTION INTRAVENOUS at 18:25

## 2021-08-08 RX ADMIN — SODIUM CHLORIDE, POTASSIUM CHLORIDE, SODIUM LACTATE AND CALCIUM CHLORIDE 1000 ML: 600; 310; 30; 20 INJECTION, SOLUTION INTRAVENOUS at 18:24

## 2021-08-08 RX ADMIN — ONDANSETRON 4 MG: 2 INJECTION INTRAMUSCULAR; INTRAVENOUS at 20:02

## 2021-08-08 RX ADMIN — LIDOCAINE HYDROCHLORIDE 60 MG: 20 INJECTION, SOLUTION EPIDURAL; INFILTRATION; INTRACAUDAL; PERINEURAL at 19:47

## 2021-08-08 RX ADMIN — KETOROLAC TROMETHAMINE 30 MG: 30 INJECTION, SOLUTION INTRAMUSCULAR at 20:06

## 2021-08-08 RX ADMIN — SUGAMMADEX 200 MG: 100 INJECTION, SOLUTION INTRAVENOUS at 20:12

## 2021-08-08 RX ADMIN — ROCURONIUM BROMIDE 50 MG: 10 INJECTION, SOLUTION INTRAVENOUS at 19:47

## 2021-08-08 RX ADMIN — IOHEXOL 100 ML: 350 INJECTION, SOLUTION INTRAVENOUS at 16:57

## 2021-08-08 ASSESSMENT — PAIN SCALES - GENERAL: PAIN_LEVEL: 3

## 2021-08-08 ASSESSMENT — PAIN DESCRIPTION - PAIN TYPE
TYPE: SURGICAL PAIN

## 2021-08-08 ASSESSMENT — PAIN DESCRIPTION - DESCRIPTORS: DESCRIPTORS: ACHING

## 2021-08-08 NOTE — ED TRIAGE NOTES
"Presents accompanied by parent.  C/O acute onset of right mid abdominal pain recurring since this AM.  He denies any other associated symptoms.  Chief Complaint   Patient presents with   • Abdominal Pain     /87   Pulse 66   Temp 36.1 °C (97 °F) (Temporal)   Resp 20   Ht 1.905 m (6' 3\")   Wt 93 kg (205 lb 0.4 oz)   SpO2 98%   BMI 25.63 kg/m²      "

## 2021-08-09 NOTE — ANESTHESIA PROCEDURE NOTES
Airway    Date/Time: 8/8/2021 7:47 PM  Performed by: Mir Harvey M.D.  Authorized by: Mir Harvey M.D.     Location:  OR  Urgency:  Elective  Difficult Airway: No    Indications for Airway Management:  Anesthesia      Spontaneous Ventilation: absent    Sedation Level:  Deep  Preoxygenated: Yes    Patient Position:  Sniffing  Mask Difficulty Assessment:  0 - not attempted  Final Airway Type:  Endotracheal airway  Final Endotracheal Airway:  ETT  Cuffed: Yes    Technique Used for Successful ETT Placement:  Direct laryngoscopy  Devices/Methods Used in Placement:  Intubating stylet and cricoid pressure    Insertion Site:  Oral  Blade Type:  Nasir  Laryngoscope Blade/Videolaryngoscope Blade Size:  3  ETT Size (mm):  7.5  Measured from:  Teeth  ETT to Teeth (cm):  23  Placement Verified by: auscultation and capnometry    Cormack-Lehane Classification:  Grade I - full view of glottis  Number of Attempts at Approach:  1

## 2021-08-09 NOTE — ED NOTES
Pt rounding upon return form ct. In no apparent distress, skin warm and dry, vs as charted. States last po, approx 1100 this am, last fluids approx 1330. Await results, call light in reach. Aware of continued npo

## 2021-08-09 NOTE — ED NOTES
Med rec completed per patient interview.   Patient denies taking any prescription or OTC medications; no dietary supplements or herbals      Allergies reviewed.   Patient states NO outpatient antibiotics in the last 14 days.      Pharmacy - [facility] [address], [place], [place]

## 2021-08-09 NOTE — ANESTHESIA PREPROCEDURE EVALUATION
Relevant Problems   No relevant active problems       Physical Exam    Airway   Mallampati: II  TM distance: >3 FB  Neck ROM: full       Cardiovascular - normal exam  Rhythm: regular  Rate: normal  (-) murmur     Dental - normal exam           Pulmonary - normal exam  Breath sounds clear to auscultation     Abdominal    Neurological - normal exam                 Anesthesia Plan    ASA 1- EMERGENT   ASA physical status emergent criteria: acutely contaminated wound or identified infection source    Plan - general       Airway plan will be ETT          Induction: intravenous    Postoperative Plan: Postoperative administration of opioids is intended.    Pertinent diagnostic labs and testing reviewed    Informed Consent:    Anesthetic plan and risks discussed with patient.

## 2021-08-09 NOTE — OR NURSING
Patient allergies and NPO status verified and belongings secured. Patient verbalizes understanding of pain scale, expected course of stay and plan of care. Surgical site verified with patient. IV access confirmed.. Sequentials placed on legs. Triple aim completed.

## 2021-08-09 NOTE — ED NOTES
Covid result in the computer, attempted to call [phone] so patient can be transferred to [facility]

## 2021-08-09 NOTE — OP REPORT
POST-OPERATIVE NOTE    PREOPERATIVE DIAGNOSIS: Acute appendicitis    POSTOPERATIVE DIAGNOSIS: Acute appendicitis without rupture    PROCEDURE PERFORMED: Laparoscopic appendectomy    SURGEON: Terry Oconnell M.D., MD    ASSISTANT: None    ANESTHESIOLOGIST:  Anesthesiologist: Mir Harvey M.D.     ANESTHESIA: general     FINDINGS: Inflamed appendix without rupture.     WOUND CLASS: Contaminated    SPECIMEN: Appendix    ESTIMATED BLOOD LOSS: 15 mL    Indication: Patient is a 30-year-old male who present with signs symptoms and CT scan findings consistent with acute appendicitis.  Patient was counseled extensively as of the risk first benefits of surgery and agreed to proceed fully informed.    Description of the procedure:    The patient was prepped and draped in the standard sterile surgical fashion after induction of general anesthesia.  Appropriate timeout had been performed and antibiotics delivered.  A periumbilical verres insertion was performed and the abdomen was insufflated to 15 mmHg CO2.  A 5 mm Visiport was applied.  A suprapubic 5 mm trocar and a subxiphoid 12 mm trocar placed under direct visualization.    The appendix was located in its right lower quadrant position.  It was dilated and inflamed but not perforated.  A Thunder Beat was used to carefully take the mesoappendix down to the base of the appendix.  A 45 mm vascular load stapler was used to transect the appendix at its base.  An Endo Catch was used to retrieve the appendix via the subxiphoid incision.    The right lower quadrant was irrigated until clear.  The staple line was intact.  Hemostasis was meticulously achieved.  An Endo Close device was used to close the fascia of the subxiphoid trocar.  Monocryl was used for skin edges.  Dermabond was applied as a dressing.  The patient was extubated to recovery in satisfactory condition.          ____________________________________     Terry Oconnell M.D., MD    DT: 8/8/2021  8:12 PM      Cc:  Pcp Pt States None

## 2021-08-09 NOTE — ANESTHESIA POSTPROCEDURE EVALUATION
Patient: Phillip Cardoso    Procedure Summary     Date: 08/08/21 Room / Location:  OR  / SURGERY [facility]    Anesthesia Start: 1943 Anesthesia Stop: 2019    Procedure: APPENDECTOMY, LAPAROSCOPIC (Abdomen) Diagnosis: (Acute Appendicitis)    Surgeons: Terry Oconnell M.D. Responsible Provider: Mir Harvey M.D.    Anesthesia Type: general ASA Status: 1 - Emergent          Final Anesthesia Type: general  Last vitals  BP   Blood Pressure: 133/60    Temp   36.7 °C (98.1 °F)    Pulse   (!) 59   Resp   20    SpO2   98 %      Anesthesia Post Evaluation    Patient location during evaluation: PACU  Patient participation: complete - patient participated  Level of consciousness: awake and alert  Pain score: 3    Airway patency: patent  Anesthetic complications: no  Cardiovascular status: hemodynamically stable  Respiratory status: acceptable  Hydration status: euvolemic    PONV: none          No complications documented.

## 2021-08-09 NOTE — CONSULTS
"General Surgery Consult          CHIEF COMPLAINT: Abdominal pain.    HISTORY OF PRESENT ILLNESS: The patient is a 30 y.o. male, who presents with abdominal pain.  It became so severe he presented to the emergency department at [facility].  Here he underwent a work-up to include a CT scan consistent with appendicitis.  General surgery was consulted for appendectomy.  The patient describes right lower quadrant abdominal pain that is worse with movement without relieving factors.     Referring Provider: Dr. Zacarias    BMI:Body mass index is 25.63 kg/m².     PAST MEDICAL HISTORY:       PAST SURGICAL HISTORY: patient denies any surgical history     ALLERGIES: No Known Allergies     CURRENT MEDICATIONS:   Home Medications     Reviewed by Michelle Pritchard on 08/08/21 at 1927  Med List Status: Complete   Medication Last Dose Status   lactated ringers infusion  Active   lactated ringers infusion (BOLUS)  Active                FAMILY HISTORY: History reviewed. No pertinent family history.     SOCIAL HISTORY:   Social History     Tobacco Use   • Smoking status: Never Smoker   • Smokeless tobacco: Never Used   Substance and Sexual Activity   • Alcohol use: Yes     Comment: Occasionally   • Drug use: No   • Sexual activity: Not on file       REVIEW OF SYSTEMS: Comprehensive review of systems was negative aside from abdominal pain described in the history and physical    PHYSICAL EXAMINATION:     GENERAL: The patient is in no acute distress.   VITAL SIGNS: /60   Pulse (!) 59   Temp 36.7 °C (98.1 °F) (Temporal)   Resp 20   Ht 1.905 m (6' 3\")   Wt 93 kg (205 lb 0.4 oz)   SpO2 98%   HEAD AND NECK: Demonstrates symmetric, reactive pupils. Extraocular muscles   are intact. Nares and oropharynx are clear.   NECK: Supple. No adenopathy.  CHEST:No respiratory distress.    CARDIOVASCULAR: Regular rate. The extremities are well perfused.   ABDOMEN: Tender to palpation in the right lower quadrant at " McBurney's point.   EXTREMITIES: Examination of the upper and lower extremities demonstrates no cyanosis edema or clubbing.  NEUROLOGIC: Alert & oriented x 3, Normal motor function, Normal sensory function, No focal deficits noted.    LABORATORY VALUES:   Recent Labs     08/08/21  1441   WBC 11.2*   RBC 5.20   HEMOGLOBIN 15.8   HEMATOCRIT 45.5   MCV 87.5   MCH 30.4   MCHC 34.7   RDW 38.9   PLATELETCT 176   MPV 10.4     Recent Labs     08/08/21  1441   SODIUM 136   POTASSIUM 4.1   CHLORIDE 99   CO2 25   GLUCOSE 90   BUN 18   CREATININE 1.10   CALCIUM 9.6     Recent Labs     08/08/21  1441   ASTSGOT 18   ALTSGPT 17   TBILIRUBIN 0.5   ALKPHOSPHAT 72   GLOBULIN 2.5            IMAGING:   CT-ABDOMEN-PELVIS WITH   Final Result      1.  Appendix is mildly distended and appears thick-walled with minimal surrounding inflammation. Findings suspicious for early/mild appendicitis.   2.  Small hypodense lesion within the spleen is too small to characterize and could represent a small hemangioma. Borderline splenomegaly.   3.  Small hypodense hepatic lesion is also too small to characterize.   4.  Mildly enlarged borderline enlarged right lower quadrant mesenteric lymph node may be reactive.   5.  Trace free fluid in the pelvis.             Problem List:  There are no problems to display for this patient.      IMPRESSION AND PLAN:     1.  Acute appendicitis.    1.  The patient will be taken to the operating room for a laparoscopic appendectomy. The surgical conduct was explained. Potential complications including but not limited to infection, bleeding, damage to adjacent structures, anesthetic complications were discussed in detail. Questions were elicited and answered to his satisfaction. He understands the rationale for surgery and elects to proceed.  Operative consent signed.              ___________________________________   Terry Oconnell M.D.    [facility].  [phone]    DD: 8/8/2021 DT: 7:32 PM

## 2021-08-09 NOTE — ANESTHESIA TIME REPORT
Anesthesia Start and Stop Event Times     Date Time Event    8/8/2021 1914 Ready for Procedure     1943 Anesthesia Start     2019 Anesthesia Stop        Responsible Staff  08/08/21    Name Role Begin End    Mir Harvey M.D. Anesth 1943 2019        Preop Diagnosis (Free Text):  Pre-op Diagnosis     Acute Appendicitis        Preop Diagnosis (Codes):    Post op Diagnosis  Acute appendicitis      Premium Reason  B. 1st Call    Comments:

## 2021-08-09 NOTE — ED PROVIDER NOTES
"ED Provider Note    CHIEF COMPLAINT  Chief Complaint   Patient presents with   • Abdominal Pain       HPI  Phillip Cardoso is a 30 y.o. male who presents with complaints of abdominal pain, along with loss of appetite today.  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  He said he developed some mild diffuse abdominal pain and thought it was due to not eating earlier today.  However the pain continued to worsen, then localized down to the right lower quadrant.  The patient says he has not eaten anything today as he has no appetite.  He denies any fever, chills, sore throat, cough, congestion, any difficulty breathing.  He has had no nausea, vomiting, or diarrhea.    REVIEW OF SYSTEMS  See HPI for further details. All other systems are negative.     PAST MEDICAL HISTORY  History reviewed. No pertinent past medical history.    FAMILY HISTORY  History reviewed. No pertinent family history.    SOCIAL HISTORY  Social History     Tobacco Use   • Smoking status: Never Smoker   • Smokeless tobacco: Never Used   Substance Use Topics   • Alcohol use: Yes     Comment: Occasionally   • Drug use: No      Social History     Substance and Sexual Activity   Drug Use No       SURGICAL HISTORY  History reviewed. No pertinent surgical history.    CURRENT MEDICATIONS  Home Medications    **Home medications have not yet been reviewed for this encounter**         ALLERGIES  No Known Allergies    PHYSICAL EXAM0  VITAL SIGNS: Blood Pressure 140/69   Pulse 71   Temperature 36.1 °C (97 °F) (Temporal)   Respiration 20   Height 1.905 m (6' 3\")   Weight 93 kg (205 lb 0.4 oz)   Oxygen Saturation 96%   Body Mass Index 25.63 kg/m²   Constitutional: Awake, alert, in no acute distress, Non-toxic appearance.   HENT: Atraumatic. Bilateral external ears normal, mucous membranes moist, throat nonerythematous without exudates, nose is normal.  Eyes: PERRL, EOMI, conjunctiva moist, noninjected.  Neck: Nontender, " Normal range of motion, No nuchal rigidity, No stridor.   Lymphatic: No lymphadenopathy noted.   Cardiovascular: Regular rate and rhythm, no murmurs, rubs, gallops.  Thorax & Lungs:  Good breath sounds bilaterally, no wheezes, rales, or retractions.  No chest tenderness.  Abdomen: Bowel sounds normal, Soft, nondistended, there is tenderness to the right lower quadrant McBurney's point, there is referred tenderness from the left lower abdomen, no tenderness to the upper abdomen, there is no rebound, guarding, or masses.  Back: No CVA or spinal tenderness.  Extremities: Intact distal pulses, No edema, No tenderness.   Skin: Warm, Dry, No rashes.   Musculoskeletal: No joint swelling or tenderness.  Neurologic: Alert & oriented x 3, sensory and motor function normal. No focal deficits.   Psychiatric: Affect normal, Judgment normal, Mood normal.         LABS  Labs Reviewed   CBC WITH DIFFERENTIAL - Abnormal; Notable for the following components:       Result Value    WBC 11.2 (*)     Neutrophils-Polys 79.70 (*)     Lymphocytes 10.80 (*)     Neutrophils (Absolute) 8.94 (*)     Monos (Absolute) 0.88 (*)     All other components within normal limits   COMP METABOLIC PANEL   LIPASE   URINALYSIS   ESTIMATED GFR   URINALYSIS       All labs reviewed by me.      RADIOLOGY/PROCEDURES  CT-ABDOMEN-PELVIS WITH   Final Result      1.  Appendix is mildly distended and appears thick-walled with minimal surrounding inflammation. Findings suspicious for early/mild appendicitis.   2.  Small hypodense lesion within the spleen is too small to characterize and could represent a small hemangioma. Borderline splenomegaly.   3.  Small hypodense hepatic lesion is also too small to characterize.   4.  Mildly enlarged borderline enlarged right lower quadrant mesenteric lymph node may be reactive.   5.  Trace free fluid in the pelvis.             The radiologist's interpretations of all radiological studies have been reviewed by me.        COURSE &  MEDICAL DECISION MAKING  Pertinent Labs & Imaging studies reviewed. (See chart for details)  The patient presents with the above complaints.  Given his symptoms and area of pain, concern is for possible appendicitis.  IV was placed, he was given a liter bolus of lactated Ringer's.  He declines any pain medication.  Lab work shows a white count of 11,200 with 80% polys, chemistry profile normal.  CT scan of abdomen/pelvis with IV contrast shows findings consistent with early appendicitis.  Findings were discussed with the patient.  He is given a dose of Rocephin and Flagyl.  Dr. Oconnell of general surgery was consulted and will be in to see the patient.    FINAL IMPRESSION  1. Acute appendicitis, unspecified acute appendicitis type          Electronically signed by: Juan David Zacarias M.D., 8/8/2021 5:47 PM

## 2021-08-09 NOTE — DISCHARGE INSTRUCTIONS
ACTIVITY: Rest and take it easy for the first 24 hours.  A responsible adult is recommended to remain with you during that time.  It is normal to feel sleepy.  We encourage you to not do anything that requires balance, judgment or coordination.    MILD FLU-LIKE SYMPTOMS ARE NORMAL. YOU MAY EXPERIENCE GENERALIZED MUSCLE ACHES, THROAT IRRITATION, HEADACHE AND/OR SOME NAUSEA.    FOR 24 HOURS DO NOT:  Drive, operate machinery or run household appliances.  Drink beer or alcoholic beverages.   Make important decisions or sign legal documents.    SPECIAL INSTRUCTIONS:     1.   The pain medication is extremely constipating.    Take a stool softener and drink lots of water.  It also can be addicting.  Please try to transition to an over the counter pain remedy as soon as possible.     2.   Alternating ice and heat for 30 minutes can greatly reduce your pain.     3.   It's ok to shower on the day after your surgery.   Do not scrub the incisions.     4.   After laparoscopy, it's common to have shoulder pain or pain when you take a deep breath.   If the pain radiates down your arm, call or present to the ER.     5.   Please call for redness or drainage from the wound sites that persists.     6.   Feel free to call with questions at [phone].   If you have paperwork that needs filling out, please contact us at this number.     7.   Follow up with me in 1-2 weeks for your postoperative exam.     8.   Activity restrictions vary according to the surgery.   If it hurts, don't do it.   You may begin light exercise at 7-10 days.     Terry Oconnell MD FACS   [facility]     DIET: To avoid nausea, slowly advance diet as tolerated, avoiding spicy or greasy foods for the first day.  Add more substantial food to your diet according to your physician's instructions. INCREASE FLUIDS AND FIBER TO AVOID CONSTIPATION.      FOLLOW-UP APPOINTMENT:  A follow-up appointment should be arranged with your doctor in 1-2 weeks; call  to schedule.    You should CALL YOUR PHYSICIAN if you develop:  Fever greater than 101 degrees F.  Pain not relieved by medication, or persistent nausea or vomiting.  Excessive bleeding (blood soaking through dressing) or unexpected drainage from the wound.  Extreme redness or swelling around the incision site, drainage of pus or foul smelling drainage.  Inability to urinate or empty your bladder within 8 hours.    You should call 911 if you develop problems with breathing or chest pain.  If you are unable to contact your doctor or surgical center, you should go to the nearest emergency room or urgent care center.      Dr. Oconnell's telephone #: [phone]    If any questions arise, call your doctor.  If your doctor is not available, please feel free to call the Surgical Center at [phone]. The Contact Center is open Monday through Friday 7AM to 5PM and may speak to a nurse at [phone], or toll free at [phone].     A registered nurse may call you a few days after your surgery to see how you are doing after your procedure.    MEDICATIONS: Resume taking daily medication.  Take prescribed pain medication with food.  If no medication is prescribed, you may take non-aspirin pain medication if needed.  PAIN MEDICATION CAN BE VERY CONSTIPATING.  Take a stool softener or laxative such as senokot, pericolace, or milk of magnesia if needed.    Prescription sent electronically.  Last pain medication given at n/a.    If your physician has prescribed pain medication that includes Acetaminophen (Tylenol), do not take additional Acetaminophen (Tylenol) while taking the prescribed medication.    Depression / Suicide Risk    As you are discharged from this [facility] facility, it is important to learn how to keep safe from harming yourself.    Recognize the warning signs:  · Abrupt changes in personality, positive or negative- including increase in energy   · Giving away possessions  · Change in eating patterns-  significant weight changes-  positive or negative  · Change in sleeping patterns- unable to sleep or sleeping all the time   · Unwillingness or inability to communicate  · Depression  · Unusual sadness, discouragement and loneliness  · Talk of wanting to die  · Neglect of personal appearance   · Rebelliousness- reckless behavior  · Withdrawal from people/activities they love  · Confusion- inability to concentrate     If you or a loved one observes any of these behaviors or has concerns about self-harm, here's what you can do:  · Talk about it- your feelings and reasons for harming yourself  · Remove any means that you might use to hurt yourself (examples: pills, rope, extension cords, firearm)  · Get professional help from the community (Mental Health, Substance Abuse, psychological counseling)  · Do not be alone:Call your Safe Contact- someone whom you trust who will be there for you.  · Call your local CRISIS HOTLINE 367-2568 or 643-738-2187  · Call your local Children's Mobile Crisis Response Team [place] (347) 335-0009 or www.WeStore  · Call the toll free National Suicide Prevention Hotlines   · National Suicide Prevention Lifeline 776-657-OLVZ (2790)  · National Hope Line Network 800-SUICIDE (842-3719)

## 2021-08-09 NOTE — ED NOTES
Report to Huong [place] at [id number].     Pending COVID result then will be transferred to the OR.     Patient and family member updated with his plan of care.

## 2022-09-12 ENCOUNTER — OFFICE VISIT (OUTPATIENT)
Dept: MEDICAL GROUP | Facility: LAB | Age: 32
End: 2022-09-12
Payer: COMMERCIAL

## 2022-09-12 VITALS
HEIGHT: 75 IN | BODY MASS INDEX: 25.91 KG/M2 | DIASTOLIC BLOOD PRESSURE: 56 MMHG | SYSTOLIC BLOOD PRESSURE: 120 MMHG | WEIGHT: 208.4 LBS | RESPIRATION RATE: 14 BRPM | HEART RATE: 72 BPM | OXYGEN SATURATION: 96 % | TEMPERATURE: 98.7 F

## 2022-09-12 DIAGNOSIS — Z00.00 PREVENTATIVE HEALTH CARE: ICD-10-CM

## 2022-09-12 DIAGNOSIS — R53.83 FATIGUE, UNSPECIFIED TYPE: ICD-10-CM

## 2022-09-12 PROCEDURE — 99385 PREV VISIT NEW AGE 18-39: CPT | Performed by: NURSE PRACTITIONER

## 2022-09-12 ASSESSMENT — PATIENT HEALTH QUESTIONNAIRE - PHQ9: CLINICAL INTERPRETATION OF PHQ2 SCORE: 0

## 2022-09-12 ASSESSMENT — FIBROSIS 4 INDEX: FIB4 SCORE: .8185577344976237856

## 2022-09-12 NOTE — PROGRESS NOTES
Subjective:     CC:    Chief Complaint   Patient presents with    Establish Care    Requesting Labs     HISTORY OF THE PRESENT ILLNESS: Patient is a 31 y.o. male. This pleasant patient is here today to establish care and discuss the following:    Health Maintenance  Patient is here today for an annual.  Up-to-date on tetanus booster. Has exercising regularly and eating a balanced diet. He has noticed some increased fatigue, brother has low testosterone and is on replacement therapy. He would like to check his testosterone at this time.     - Dyslipidemia (30-45): due  - Diabetes (HTN, HLD, BMI >25): due  - Depression screening (PHQ-2 and/or PHQ-9): 0  - Osteoporosis: Ca intake, DEXA (>65 or with risk factors): n/a  - If fx, needs BMD test or tx w/i 6 months of dx  - Dental: regularly  - Eye: no concerns  AAA Screening (Once in men 65-75 years who have ever smoked): n/a  Diet: balanced  Exercise: regular  Substance Use: none  Tobacco Use/counseling: n/a  Safe in relationship:   Seat belts, bike helmet, gun safety discussed.  Sun protection used.     Cancer screening  - Colon CA (45-75) - FIT (annual) cspy (q10yr): n/a  - Lung CA (50-81y/o w/20py smoking hx & currently smoke or quit w/i past 15 yrs): n/a  - Prostate Cancer Screening/PSA: n/a  - Cervical CA (21-65): n/a  -  HX Abnormal pap/HPV: none  - Breast CA: mammo (required 50-75yo) or starting 40 (ACOG, ACR), 45 (ACS), 50 (USPTF): n/a  - Skin cancer screening: n/a     Infectious disease screening/Immunizations  --STI/HIV Screening: none  --Practices safe sex.  --Hepatitis C Screening (18 to 80 yo): n/a  --Immunizations:               Influenza: due   Covid-19: due              Tetanus: 2017              Shingles: n/a    ROS:   Gen: no fevers/chills, no changes in weight  Eyes: no changes in vision  ENT: no sore throat, no hearing loss, no bloody nose  Pulm: no sob, no cough  CV: no chest pain, no palpitations  GI: no nausea/vomiting, no diarrhea  : no  "dysuria  MSk: no myalgias  Skin: no rash  Neuro: no headaches, no numbness/tingling  Heme/Lymph: no easy bruising        - NOTE: All other systems reviewed and are negative, except as in HPI.      Objective:     Exam: /56 (BP Location: Right arm, Patient Position: Sitting, BP Cuff Size: Adult)   Pulse 72   Temp 37.1 °C (98.7 °F)   Resp 14   Ht 1.892 m (6' 2.5\")   Wt 94.5 kg (208 lb 6.4 oz)   SpO2 96%  Body mass index is 26.4 kg/m².    Constitutional: Alert, no distress, well-groomed.  Skin: Warm, dry, good turgor, no rashes in visible areas.  Eye: Equal, round and reactive, conjunctiva clear, lids normal.  ENMT: Lips without lesions, good dentition, moist mucous membranes.  Neck: Trachea midline, no masses, no thyromegaly.  Respiratory: Unlabored respiratory effort, no cough. Clear to ausculation. No rales, ronchi, or wheezing.  Cardiovascular: Regular rate and rhythm without murmur. Carotid and radial pulses are intact and equal bilaterally.  MSK: Normal gait, moves all extremities.  Neuro: Grossly non-focal.   Psych: Alert and oriented x3, normal affect and mood.    Assessment & Plan:   31 y.o. male with the following -    1. Preventative health care  This is a healthy 31-year-old male here today for a preventative exam.  Previous medical history, healthcare maintenance and immunization status reviewed.  Patient is up to date.  Annual labwork ordered.  See discussion of anticipatory guidance below.  Patient will return annually for preventative exams.    Dentist and eye doctor   Labs per orders.  Vaccinations per orders.  Counseling about diet, supplements, exercise, skin care and safe sex.    - Comp Metabolic Panel; Future  - Lipid Profile; Future  - HEMOGLOBIN A1C; Future  - TSH WITH REFLEX TO FT4; Future  - VITAMIN D,25 HYDROXY (DEFICIENCY); Future  - CBC WITHOUT DIFFERENTIAL; Future    2. Fatigue, unspecified type  Ongoing fatigue with unknown etiology.  No red flags and reassuring exam.  Labs as " indicated.  Will follow-up labs with the patient at next appointment.  Emphasized importance of healthy diet, drinking 8 glasses of water a day and exercising.   - Testosterone, Free & Total, Adult Male (w/SHBG); Future    Please note that this dictation was created using voice recognition software. I have made every reasonable attempt to correct obvious errors, but I expect that there are errors of grammar and possibly content that I did not discover before finalizing the note.

## 2022-09-14 ENCOUNTER — HOSPITAL ENCOUNTER (OUTPATIENT)
Dept: LAB | Facility: MEDICAL CENTER | Age: 32
End: 2022-09-14
Attending: NURSE PRACTITIONER
Payer: COMMERCIAL

## 2022-09-14 DIAGNOSIS — R53.83 FATIGUE, UNSPECIFIED TYPE: ICD-10-CM

## 2022-09-14 DIAGNOSIS — Z00.00 PREVENTATIVE HEALTH CARE: ICD-10-CM

## 2022-09-14 LAB
25(OH)D3 SERPL-MCNC: 46 NG/ML (ref 30–100)
ALBUMIN SERPL BCP-MCNC: 5 G/DL (ref 3.2–4.9)
ALBUMIN/GLOB SERPL: 2.3 G/DL
ALP SERPL-CCNC: 70 U/L (ref 30–99)
ALT SERPL-CCNC: 18 U/L (ref 2–50)
ANION GAP SERPL CALC-SCNC: 11 MMOL/L (ref 7–16)
AST SERPL-CCNC: 16 U/L (ref 12–45)
BILIRUB SERPL-MCNC: 0.5 MG/DL (ref 0.1–1.5)
BUN SERPL-MCNC: 18 MG/DL (ref 8–22)
CALCIUM SERPL-MCNC: 9.9 MG/DL (ref 8.5–10.5)
CHLORIDE SERPL-SCNC: 101 MMOL/L (ref 96–112)
CHOLEST SERPL-MCNC: 282 MG/DL (ref 100–199)
CO2 SERPL-SCNC: 27 MMOL/L (ref 20–33)
CREAT SERPL-MCNC: 1.06 MG/DL (ref 0.5–1.4)
ERYTHROCYTE [DISTWIDTH] IN BLOOD BY AUTOMATED COUNT: 41.1 FL (ref 35.9–50)
EST. AVERAGE GLUCOSE BLD GHB EST-MCNC: 100 MG/DL
FASTING STATUS PATIENT QL REPORTED: NORMAL
GFR SERPLBLD CREATININE-BSD FMLA CKD-EPI: 95 ML/MIN/1.73 M 2
GLOBULIN SER CALC-MCNC: 2.2 G/DL (ref 1.9–3.5)
GLUCOSE SERPL-MCNC: 91 MG/DL (ref 65–99)
HBA1C MFR BLD: 5.1 % (ref 4–5.6)
HCT VFR BLD AUTO: 51.5 % (ref 42–52)
HDLC SERPL-MCNC: 80 MG/DL
HGB BLD-MCNC: 17.5 G/DL (ref 14–18)
LDLC SERPL CALC-MCNC: 190 MG/DL
MCH RBC QN AUTO: 30.4 PG (ref 27–33)
MCHC RBC AUTO-ENTMCNC: 34 G/DL (ref 33.7–35.3)
MCV RBC AUTO: 89.4 FL (ref 81.4–97.8)
PLATELET # BLD AUTO: 210 K/UL (ref 164–446)
PMV BLD AUTO: 10.7 FL (ref 9–12.9)
POTASSIUM SERPL-SCNC: 4.5 MMOL/L (ref 3.6–5.5)
PROT SERPL-MCNC: 7.2 G/DL (ref 6–8.2)
RBC # BLD AUTO: 5.76 M/UL (ref 4.7–6.1)
SODIUM SERPL-SCNC: 139 MMOL/L (ref 135–145)
TRIGL SERPL-MCNC: 62 MG/DL (ref 0–149)
TSH SERPL DL<=0.005 MIU/L-ACNC: 3.4 UIU/ML (ref 0.38–5.33)
WBC # BLD AUTO: 5.5 K/UL (ref 4.8–10.8)

## 2022-09-14 PROCEDURE — 83036 HEMOGLOBIN GLYCOSYLATED A1C: CPT

## 2022-09-14 PROCEDURE — 84443 ASSAY THYROID STIM HORMONE: CPT

## 2022-09-14 PROCEDURE — 80061 LIPID PANEL: CPT

## 2022-09-14 PROCEDURE — 85027 COMPLETE CBC AUTOMATED: CPT

## 2022-09-14 PROCEDURE — 36415 COLL VENOUS BLD VENIPUNCTURE: CPT

## 2022-09-14 PROCEDURE — 84402 ASSAY OF FREE TESTOSTERONE: CPT

## 2022-09-14 PROCEDURE — 84403 ASSAY OF TOTAL TESTOSTERONE: CPT

## 2022-09-14 PROCEDURE — 80053 COMPREHEN METABOLIC PANEL: CPT

## 2022-09-14 PROCEDURE — 82306 VITAMIN D 25 HYDROXY: CPT

## 2022-09-14 PROCEDURE — 84270 ASSAY OF SEX HORMONE GLOBUL: CPT

## 2022-09-17 LAB
SHBG SERPL-SCNC: 40 NMOL/L (ref 17–56)
TESTOST FREE MFR SERPL: 1.7 % (ref 1.6–2.9)
TESTOST FREE SERPL-MCNC: 81 PG/ML (ref 47–244)
TESTOST SERPL-MCNC: 477 NG/DL (ref 300–1080)

## 2024-07-30 ENCOUNTER — APPOINTMENT (OUTPATIENT)
Dept: URBAN - METROPOLITAN AREA CLINIC 227 | Age: 34
Setting detail: DERMATOLOGY
End: 2024-07-30

## 2024-07-30 DIAGNOSIS — L57.8 OTHER SKIN CHANGES DUE TO CHRONIC EXPOSURE TO NONIONIZING RADIATION: ICD-10-CM

## 2024-07-30 DIAGNOSIS — D18.0 HEMANGIOMA: ICD-10-CM

## 2024-07-30 DIAGNOSIS — D22 MELANOCYTIC NEVI: ICD-10-CM

## 2024-07-30 PROBLEM — D22.5 MELANOCYTIC NEVI OF TRUNK: Status: ACTIVE | Noted: 2024-07-30

## 2024-07-30 PROBLEM — D23.72 OTHER BENIGN NEOPLASM OF SKIN OF LEFT LOWER LIMB, INCLUDING HIP: Status: ACTIVE | Noted: 2024-07-30

## 2024-07-30 PROBLEM — D18.01 HEMANGIOMA OF SKIN AND SUBCUTANEOUS TISSUE: Status: ACTIVE | Noted: 2024-07-30

## 2024-07-30 PROCEDURE — OTHER COUNSELING: OTHER

## 2024-07-30 PROCEDURE — OTHER MIPS QUALITY: OTHER

## 2024-07-30 PROCEDURE — 99203 OFFICE O/P NEW LOW 30 MIN: CPT

## 2024-07-30 ASSESSMENT — LOCATION SIMPLE DESCRIPTION DERM
LOCATION SIMPLE: ABDOMEN
LOCATION SIMPLE: CHEST
LOCATION SIMPLE: LEFT UPPER BACK

## 2024-07-30 ASSESSMENT — LOCATION ZONE DERM: LOCATION ZONE: TRUNK

## 2024-07-30 ASSESSMENT — LOCATION DETAILED DESCRIPTION DERM
LOCATION DETAILED: EPIGASTRIC SKIN
LOCATION DETAILED: RIGHT MEDIAL SUPERIOR CHEST
LOCATION DETAILED: LEFT SUPERIOR UPPER BACK
LOCATION DETAILED: PERIUMBILICAL SKIN

## 2024-07-30 NOTE — PROCEDURE: MIPS QUALITY
Detail Level: Zone
Quality 130: Documentation Of Current Medications In The Medical Record: Current Medications Documented
Quality 47: Advance Care Plan: Advance Care Planning discussed and documented; advance care plan or surrogate decision maker documented in the medical record.
Quality 226: Preventive Care And Screening: Tobacco Use: Screening And Cessation Intervention: Patient screened for tobacco use and is an ex/non-smoker

## 2024-11-02 ENCOUNTER — OFFICE VISIT (OUTPATIENT)
Dept: URGENT CARE | Facility: CLINIC | Age: 34
End: 2024-11-02
Payer: COMMERCIAL

## 2024-11-02 VITALS
SYSTOLIC BLOOD PRESSURE: 120 MMHG | HEIGHT: 75 IN | WEIGHT: 215 LBS | OXYGEN SATURATION: 98 % | TEMPERATURE: 97.9 F | HEART RATE: 69 BPM | DIASTOLIC BLOOD PRESSURE: 60 MMHG | BODY MASS INDEX: 26.73 KG/M2

## 2024-11-02 DIAGNOSIS — L30.9 DERMATITIS: ICD-10-CM

## 2024-11-02 PROCEDURE — 99213 OFFICE O/P EST LOW 20 MIN: CPT | Performed by: NURSE PRACTITIONER

## 2024-11-02 PROCEDURE — 3074F SYST BP LT 130 MM HG: CPT | Performed by: NURSE PRACTITIONER

## 2024-11-02 PROCEDURE — 3078F DIAST BP <80 MM HG: CPT | Performed by: NURSE PRACTITIONER

## 2024-11-02 RX ORDER — METHYLPREDNISOLONE 4 MG/1
TABLET ORAL
Qty: 21 TABLET | Refills: 0 | Status: SHIPPED | OUTPATIENT
Start: 2024-11-02

## 2024-11-02 NOTE — PROGRESS NOTES
Phillip Cardoso is a 33 y.o. male who presents for Rash (Since 10/09 all over body)      HPI  This is a new problem. Phillip Cardoso is a 33 y.o. patient who presents to urgent care with c/o: On October 9 he developed a rash that started on his legs and the tops of his feet.  The rash was small red areas that had some scaling on him the rash spread.  It is on his legs his torso front and back his arms and his axilla.  The rash is occasionally mildly itchy if the skin gets too dry.  Putting lotion is skin does help with the itchiness.  The rash sometimes has a burning sensation to it.  It continues to spread and does not go away.  He has been taking Zyrtec and sometimes taking Zyrtec twice a day.  He has no new medications.  He not been exposed anything new.  He is never had any skin congestion before.  No other aggravating leaving factors.  No other concerns today.  He splits his time between [place] and [place].    ROS See HPI    Allergies:     No Known Allergies    PMSFS Hx:  History reviewed. No pertinent past medical history.  Past Surgical History:   Procedure Laterality Date    FL LAP,APPENDECTOMY  08/08/2021    Procedure: APPENDECTOMY, LAPAROSCOPIC;  Surgeon: Terry Oconnell M.D.;  Location: SURGERY [facility];  Service: General    KNEE ARTHROSCOPY Bilateral      Family History   Problem Relation Age of Onset    Kidney Disease Father      Social History     Tobacco Use    Smoking status: Never    Smokeless tobacco: Never   Substance Use Topics    Alcohol use: Yes     Alcohol/week: 6.0 oz     Types: 10 Cans of beer per week         Problems:   There is no problem list on file for this patient.      Medications:   No current outpatient medications on file prior to visit.     No current facility-administered medications on file prior to visit.        Objective:     /60 (BP Location: Left arm, Patient Position: Sitting, BP Cuff Size: Large adult)   Pulse 69   Temp 36.6 °C (97.9 °F) (Temporal)   " Ht 1.905 m (6' 3\")   Wt 97.5 kg (215 lb)   SpO2 98%   BMI 26.87 kg/m²     Physical Exam  Vitals and nursing note reviewed.   Constitutional:       Appearance: Normal appearance.   Cardiovascular:      Rate and Rhythm: Normal rate.      Pulses: Normal pulses.   Pulmonary:      Effort: Pulmonary effort is normal.   Skin:     Capillary Refill: Capillary refill takes less than 2 seconds.      Findings: Erythema and rash present. Rash is macular and scaling.          Neurological:      Mental Status: He is alert.         Assessment /Associated Orders:      1. Dermatitis            Medical Decision Making:    Phillip is a very pleasant 33 y.o. male who is clinically stable at today's acute urgent care visit.    No acute distress is noted.  VSS. Appropriate for outpatient care at this time.   Acute problem today with uncertain prognosis. Differential Diagnosis includes but is not limited to:   Contact dermatitis: allergen, irritant, or toxic substance.Psoriasis, Eczema, Ichthyosis, Actinic keratosis  Lichen planus, Ringworm or seborrheic keratosis or other .     Referral to dermatology for follow up  Educated in proper administration of  prescription medication(s) ordered today including safety, possible SE, risks, benefits, rationale and alternatives to therapy.   Do not take additional NSAIDS or steroids while taking RX medication. Educated on risk of lower immunity in short term, weight changes, mood changes, increased serum glucose and elevated BP while taking steroids.   Keep well hydrated  OTC emollient lotion daily and as needed.  Lukewarm showers.  Avoid hot water immersion's.  Through shared decision making a discussion of the Dx and DDx, management options (risks,benefits, and alternatives to planned treatment), natural progression and supportive care.  Expressed understanding and the treatment plan was agreed upon.   Questions were encouraged and answered   Return to urgent care prn if new or worsening sx or " if there is no improvement in condition prn.          Please note that this dictation was created using voice recognition software. I have worked with consultants from the vendor as well as technical experts from [facility] to optimize the interface. I have made every reasonable attempt to correct obvious errors, but I expect that there are errors of grammar and possibly content that I did not discover before finalizing the note.  This note was electronically signed by provider

## 2024-11-19 ENCOUNTER — APPOINTMENT (RX ONLY)
Dept: URBAN - METROPOLITAN AREA CLINIC 15 | Facility: CLINIC | Age: 34
Setting detail: DERMATOLOGY
End: 2024-11-19

## 2024-11-19 DIAGNOSIS — L40.4 GUTTATE PSORIASIS: ICD-10-CM

## 2024-11-19 PROBLEM — L30.9 DERMATITIS, UNSPECIFIED: Status: ACTIVE | Noted: 2024-11-19

## 2024-11-19 PROCEDURE — ? PRESCRIPTION

## 2024-11-19 PROCEDURE — ? COUNSELING

## 2024-11-19 PROCEDURE — ? ORDER TESTS

## 2024-11-19 PROCEDURE — 99203 OFFICE O/P NEW LOW 30 MIN: CPT

## 2024-11-19 RX ORDER — AMOXICILLIN AND CLAVULANATE POTASSIUM 500; 125 MG/1; 1/1
TABLET, FILM COATED ORAL
Qty: 20 | Refills: 0 | Status: ERX | COMMUNITY
Start: 2024-11-19

## 2024-11-19 RX ORDER — TRIAMCINOLONE ACETONIDE 1 MG/G
CREAM TOPICAL
Qty: 454 | Refills: 1 | Status: ERX | COMMUNITY
Start: 2024-11-19

## 2024-11-19 RX ADMIN — TRIAMCINOLONE ACETONIDE: 1 CREAM TOPICAL at 00:00

## 2024-11-19 RX ADMIN — AMOXICILLIN AND CLAVULANATE POTASSIUM: 500; 125 TABLET, FILM COATED ORAL at 00:00

## 2024-11-19 ASSESSMENT — LOCATION SIMPLE DESCRIPTION DERM
LOCATION SIMPLE: RIGHT UPPER ARM
LOCATION SIMPLE: RIGHT FOREARM
LOCATION SIMPLE: UPPER BACK
LOCATION SIMPLE: LEFT PRETIBIAL REGION
LOCATION SIMPLE: ABDOMEN
LOCATION SIMPLE: LEFT FOOT
LOCATION SIMPLE: RIGHT THIGH
LOCATION SIMPLE: LEFT BUTTOCK
LOCATION SIMPLE: RIGHT CALF
LOCATION SIMPLE: LEFT THIGH
LOCATION SIMPLE: RIGHT FOOT
LOCATION SIMPLE: LEFT CALF
LOCATION SIMPLE: RIGHT BUTTOCK
LOCATION SIMPLE: RIGHT PRETIBIAL REGION
LOCATION SIMPLE: LEFT UPPER ARM
LOCATION SIMPLE: LEFT POSTERIOR THIGH
LOCATION SIMPLE: RIGHT POSTERIOR THIGH
LOCATION SIMPLE: LEFT FOREARM

## 2024-11-19 ASSESSMENT — LOCATION DETAILED DESCRIPTION DERM
LOCATION DETAILED: LEFT ANTERIOR PROXIMAL UPPER ARM
LOCATION DETAILED: INFERIOR THORACIC SPINE
LOCATION DETAILED: LEFT ANTERIOR PROXIMAL THIGH
LOCATION DETAILED: RIGHT PROXIMAL PRETIBIAL REGION
LOCATION DETAILED: LEFT PROXIMAL POSTERIOR UPPER ARM
LOCATION DETAILED: LEFT DORSAL FOOT
LOCATION DETAILED: LEFT VENTRAL PROXIMAL FOREARM
LOCATION DETAILED: RIGHT BUTTOCK
LOCATION DETAILED: PERIUMBILICAL SKIN
LOCATION DETAILED: LEFT DISTAL POSTERIOR THIGH
LOCATION DETAILED: RIGHT DISTAL POSTERIOR THIGH
LOCATION DETAILED: LEFT BUTTOCK
LOCATION DETAILED: EPIGASTRIC SKIN
LOCATION DETAILED: RIGHT ANTERIOR PROXIMAL UPPER ARM
LOCATION DETAILED: LEFT PROXIMAL PRETIBIAL REGION
LOCATION DETAILED: RIGHT DISTAL CALF
LOCATION DETAILED: RIGHT VENTRAL DISTAL FOREARM
LOCATION DETAILED: LEFT DISTAL LATERAL CALF
LOCATION DETAILED: RIGHT ANTERIOR PROXIMAL THIGH
LOCATION DETAILED: RIGHT PROXIMAL POSTERIOR UPPER ARM
LOCATION DETAILED: RIGHT DORSAL FOOT
LOCATION DETAILED: LEFT PROXIMAL DORSAL FOREARM

## 2024-11-19 ASSESSMENT — LOCATION ZONE DERM
LOCATION ZONE: FEET
LOCATION ZONE: LEG
LOCATION ZONE: TRUNK
LOCATION ZONE: ARM

## 2024-11-19 NOTE — HPI: RASH
What Type Of Note Output Would You Prefer (Optional)?: Standard Output
How Severe Is Your Rash?: moderate
Is This A New Presentation, Or A Follow-Up?: Rash
Additional History: If rash itself gets dried out it begins to itch. Does not keep it up at night. Noticed it Oct 9th in [place]. History of hives. Hot tubs a lot but hasn’t since rash noticed. Went to urgent care a month ago and was prescribed a prednisone taper x 5 days, and cleared on the shoulders but nowhere else. Tried Zyrtec for 2 weeks, 20vmg daily with no signs of working. 8 years ago got hives on full body that affected blood pressure, was given IMK and given antihistamines. Pt reports that when he scratches it, it bleeds and gets very tender.

## 2024-11-19 NOTE — PROCEDURE: ORDER TESTS
Bill For Surgical Tray: no
Performing Laboratory: 0
Billing Type: Third-Party Bill
Expected Date Of Service: 11/19/2024

## 2024-11-27 ENCOUNTER — RX ONLY (OUTPATIENT)
Age: 34
Setting detail: RX ONLY
End: 2024-11-27

## 2024-11-27 RX ORDER — AMOXICILLIN AND CLAVULANATE POTASSIUM 875; 125 MG/1; MG/1
TABLET, FILM COATED ORAL
Qty: 20 | Refills: 0 | Status: ERX | COMMUNITY
Start: 2024-11-26

## 2024-12-10 ENCOUNTER — APPOINTMENT (OUTPATIENT)
Dept: URBAN - METROPOLITAN AREA CLINIC 15 | Facility: CLINIC | Age: 34
Setting detail: DERMATOLOGY
End: 2024-12-10

## 2024-12-10 DIAGNOSIS — L85.3 XEROSIS CUTIS: ICD-10-CM

## 2024-12-10 DIAGNOSIS — L81.0 POSTINFLAMMATORY HYPERPIGMENTATION: ICD-10-CM

## 2024-12-10 DIAGNOSIS — L40.4 GUTTATE PSORIASIS: ICD-10-CM

## 2024-12-10 PROCEDURE — ? RECOMMENDATIONS

## 2024-12-10 PROCEDURE — 99213 OFFICE O/P EST LOW 20 MIN: CPT

## 2024-12-10 PROCEDURE — ? COUNSELING

## 2024-12-10 PROCEDURE — ? PRESCRIPTION

## 2024-12-10 RX ORDER — TRIAMCINOLONE ACETONIDE 1 MG/G
1 CREAM TOPICAL QD
Qty: 454 | Refills: 1 | Status: ERX | COMMUNITY
Start: 2024-12-10

## 2024-12-10 RX ADMIN — TRIAMCINOLONE ACETONIDE 1: 1 CREAM TOPICAL at 00:00

## 2024-12-10 ASSESSMENT — LOCATION SIMPLE DESCRIPTION DERM
LOCATION SIMPLE: RIGHT CALF
LOCATION SIMPLE: RIGHT THIGH
LOCATION SIMPLE: RIGHT PRETIBIAL REGION
LOCATION SIMPLE: LEFT POSTERIOR THIGH
LOCATION SIMPLE: RIGHT POSTERIOR THIGH
LOCATION SIMPLE: LEFT CALF
LOCATION SIMPLE: RIGHT FOREARM
LOCATION SIMPLE: LEFT THIGH
LOCATION SIMPLE: RIGHT FOOT
LOCATION SIMPLE: LEFT FOOT
LOCATION SIMPLE: LEFT PRETIBIAL REGION

## 2024-12-10 ASSESSMENT — LOCATION DETAILED DESCRIPTION DERM
LOCATION DETAILED: RIGHT ANTERIOR PROXIMAL THIGH
LOCATION DETAILED: LEFT DISTAL POSTERIOR THIGH
LOCATION DETAILED: LEFT ANTERIOR PROXIMAL THIGH
LOCATION DETAILED: RIGHT DORSAL FOOT
LOCATION DETAILED: LEFT PROXIMAL PRETIBIAL REGION
LOCATION DETAILED: RIGHT DISTAL POSTERIOR THIGH
LOCATION DETAILED: LEFT DORSAL FOOT
LOCATION DETAILED: RIGHT DISTAL CALF
LOCATION DETAILED: LEFT DISTAL LATERAL CALF
LOCATION DETAILED: RIGHT PROXIMAL PRETIBIAL REGION
LOCATION DETAILED: RIGHT DISTAL DORSAL FOREARM

## 2024-12-10 ASSESSMENT — ITCH NUMERIC RATING SCALE: HOW SEVERE IS YOUR ITCHING?: 1

## 2024-12-10 ASSESSMENT — LOCATION ZONE DERM
LOCATION ZONE: LEG
LOCATION ZONE: FEET
LOCATION ZONE: ARM

## 2024-12-10 ASSESSMENT — PGA PSORIASIS: PGA PSORIASIS 2020: MILD

## 2024-12-10 NOTE — PROCEDURE: COUNSELING
Detail Level: Detailed
Detail Level: Generalized
Patient Specific Counseling (Will Not Stick From Patient To Patient): Discussed Osmannar phenomenon

## 2024-12-10 NOTE — PROCEDURE: RECOMMENDATIONS
Detail Level: Zone
Recommendation Preamble: The following recommendations were made during the visit:
Recommendations (Free Text): Aquaphor after shower, fish oil, staying hydrated
Render Risk Assessment In Note?: no

## (undated) DEVICE — PROTECTOR ULNA NERVE - (36PR/CA)

## (undated) DEVICE — SODIUM CHL IRRIGATION 0.9% 1000ML (12EA/CA)

## (undated) DEVICE — CHLORAPREP 26 ML APPLICATOR - ORANGE TINT(25/CA)

## (undated) DEVICE — TROCAR 5X100 NON BLADED Z-TH - READ KII (6/BX)

## (undated) DEVICE — GLOVE BIOGEL SZ 8 SURGICAL PF LTX - (50PR/BX 4BX/CA)

## (undated) DEVICE — KIT ANESTHESIA W/CIRCUIT & 3/LT BAG W/FILTER (20EA/CA)

## (undated) DEVICE — ELECTRODE 850 FOAM ADHESIVE - HYDROGEL RADIOTRNSPRNT (50/PK)

## (undated) DEVICE — Device

## (undated) DEVICE — SUCTION INSTRUMENT YANKAUER BULBOUS TIP W/O VENT (50EA/CA)

## (undated) DEVICE — SUTURE 0 VICRYL PLUS UR-6 - 27 INCH (36/BX)

## (undated) DEVICE — CANNULA W/SEAL 5X100 Z-THRE - ADED KII (12/BX)

## (undated) DEVICE — DRAPE IOBAN II INCISE 23X17 - (10EA/BX 4BX/CA)

## (undated) DEVICE — CANISTER SUCTION 3000ML MECHANICAL FILTER AUTO SHUTOFF MEDI-VAC NONSTERILE LF DISP  (40EA/CA)

## (undated) DEVICE — STAPLE 45MM BLUE 4.5MM (12EA/BX)

## (undated) DEVICE — HEAD HOLDER JUNIOR/ADULT

## (undated) DEVICE — ELECTRODE DUAL RETURN W/ CORD - (50/PK)

## (undated) DEVICE — SET SUCTION/IRRIGATION WITH DISPOSABLE TIP (6/CA )PART #0250-070-520 IS A SUB

## (undated) DEVICE — MASK ANESTHESIA ADULT  - (100/CA)

## (undated) DEVICE — NEEDLE INSFL 120MM 14GA VRRS - (20/BX)

## (undated) DEVICE — SLEEVE, VASO, THIGH, MED

## (undated) DEVICE — SUTURE GENERAL

## (undated) DEVICE — STAPLER 45MM ARTICULATING - ENDO (3EA/BX)

## (undated) DEVICE — BAG RETRIEVAL 10ML (10EA/BX)

## (undated) DEVICE — TROCAR Z THREAD 12 X 100 - BLADED (6/BX)

## (undated) DEVICE — TUBING CLEARLINK DUO-VENT - C-FLO (48EA/CA)

## (undated) DEVICE — STAPLE 45MM VASCULAR WHITE 2.5MM (12EA/BX)

## (undated) DEVICE — KIT EVACUATER 3 SPRING PVC LF 1/8 DRAIN SIZE (10EA/CA)"

## (undated) DEVICE — LACTATED RINGERS INJ 1000 ML - (14EA/CA 60CA/PF)

## (undated) DEVICE — NEPTUNE 4 PORT MANIFOLD - (20/PK)

## (undated) DEVICE — SET EXTENSION WITH 2 PORTS (48EA/CA) ***PART #2C8610 IS A SUBSTITUTE*****

## (undated) DEVICE — SENSOR SPO2 NEO LNCS ADHESIVE (20/BX) SEE USER NOTES

## (undated) DEVICE — SUTURE 4-0 MONOCRYL PLUS PS-1 - 27 INCH (36/BX)

## (undated) DEVICE — TOWEL STOP TIMEOUT SAFETY FLAG (40EA/CA)